# Patient Record
Sex: FEMALE | Race: WHITE
[De-identification: names, ages, dates, MRNs, and addresses within clinical notes are randomized per-mention and may not be internally consistent; named-entity substitution may affect disease eponyms.]

---

## 2018-05-16 NOTE — HISTORY AND PHYSICAL: SURG CNT
History & Physical


Date


May 16, 2018.





Chief Complaint


tonsillitis





History of Present Illness


The patient is a 17 year old female with complaints of chronic tonsillitis





Additional History


Hepatic Disease:  No


Endocrine Disorder:  No


Kidney Disease:  No


Hypertension:  No


Heart Disease:  No


Bleeding Tendencies:  No


Infectious Diseases:  No





Allergies


Coded Allergies:  


     No Known Allergies (Unverified , 5/4/18)





Home Medications


Scheduled


Clindamycin Hcl (Cleocin), 300 MG PO TID


Multiple Vitamins W/ Minerals (Centrum), 1 TAB PO DAILY





Physical Examination


Skin:  warm/dry, no rash


Eyes:  normal inspection, EOMI, sclerae normal


ENT:  normal ENT inspection, pharynx normal


Head:  normocephalic, atraumatic


Neck:  supple, no adenopathy, trachea midline


Respiratory/Chest:  lungs clear, normal breath sounds, no respiratory distress


Cardiovascular:  regular rate, rhythm, no edema, no murmur


Abdomen / GI:  normal bowel sounds, non tender


Back:  normal inspection


Extremities:  normal inspection, normal range of motion


Neurologic/Psych:  no motor/sensory deficits, alert, normal reflexes, oriented 

x 3





Diagnosis


chronic tonsillitis





Plan of Treatment


adenotonsillectomy

## 2018-05-17 ENCOUNTER — HOSPITAL ENCOUNTER (OUTPATIENT)
Dept: HOSPITAL 45 - X.SURG | Age: 18
Discharge: HOME | End: 2018-05-17
Attending: OTOLARYNGOLOGY
Payer: COMMERCIAL

## 2018-05-17 VITALS
TEMPERATURE: 99.32 F | OXYGEN SATURATION: 97 % | SYSTOLIC BLOOD PRESSURE: 107 MMHG | DIASTOLIC BLOOD PRESSURE: 64 MMHG | HEART RATE: 93 BPM

## 2018-05-17 VITALS
WEIGHT: 120.26 LBS | HEIGHT: 67.99 IN | BODY MASS INDEX: 18.23 KG/M2 | WEIGHT: 120.26 LBS | HEIGHT: 67.99 IN | BODY MASS INDEX: 18.23 KG/M2

## 2018-05-17 DIAGNOSIS — J35.3: ICD-10-CM

## 2018-05-17 DIAGNOSIS — J45.909: ICD-10-CM

## 2018-05-17 DIAGNOSIS — J35.01: Primary | ICD-10-CM

## 2018-05-17 RX ADMIN — SODIUM CHLORIDE, SODIUM LACTATE, POTASSIUM CHLORIDE, AND CALCIUM CHLORIDE SCH MLS/HR: 600; 310; 30; 20 INJECTION, SOLUTION INTRAVENOUS at 07:57

## 2018-05-17 RX ADMIN — SODIUM CHLORIDE, SODIUM LACTATE, POTASSIUM CHLORIDE, AND CALCIUM CHLORIDE SCH MLS/HR: 600; 310; 30; 20 INJECTION, SOLUTION INTRAVENOUS at 10:12

## 2018-05-17 NOTE — OPERATIVE REPORT
DATE OF OPERATION:  05/17/2018

 

PREOPERATIVE DIAGNOSES:  Chronic tonsillitis and adenoid hypertrophy.

 

POSTOPERATIVE DIAGNOSIS:  Chronic tonsillitis and adenoid hypertrophy.

 

PROCEDURE:  Adenotonsillectomy.

 

SURGEON:  Enedelia Akers MD

 

ANESTHESIA:  General endotracheal.

 

COMPLICATIONS:  None.

 

BLOOD LOSS:  5 mL

 

HISTORY:  A 17-year-old with recurrent chronic tonsillitis with 4 episodes of

peritonsillar abscess.

 

DESCRIPTION OF PROCEDURE:  The patient was brought to the operating room and

placed in supine position.  General endotracheal anesthesia was induced,

draped in the usual manner.  Mouth gag was placed.  Peritonsillar area was

injected with .5% Sensorcaine, 1:200:000 strength Epinephrine.  Soft palate

retracted using a red Vieyra catheter.  Tonsillectomy performed using the

coblation device coblating out the tonsil from anterior to the posterior

pillar and from the superior pole to the inferior pole.  Both tonsils were

removed in a similar manner.  Hemostasis was controlled with the coblation

device.  Adenoidectomy was performed using the coblation technique and

hemostasis controlled using the coblation technique.  The patient tolerated

the procedure well and was taken to the recovery room in satisfactory

condition.

 

 

I attest to the content of the Intraoperative Record and any orders documented therein. Any exception
s are noted below.

## 2018-05-17 NOTE — ANESTHESIA PROGRESS NT - [facility]
Anesthesia Post Op Note


Date & Time


May 17, 2018 at 11:08





Vital Signs


Pain Intensity:  7





Vital Signs Past 12 Hours








  Date Time  Temp Pulse Resp B/P (MAP) Pulse Ox O2 Delivery O2 Flow Rate FiO2


 


5/17/18 10:42 37.4 93 12 107/64 (78) 97 Room Air  


 


5/17/18 10:31 37.6   122/70    


 


5/17/18 10:28  91 14  96   


 


5/17/18 10:28  88 14     


 


5/17/18 10:26    123/73    


 


5/17/18 10:23  104 25  97   


 


5/17/18 10:23  105 25     


 


5/17/18 10:21    118/74    


 


5/17/18 10:18  83 16  97   


 


5/17/18 10:18  84 16     


 


5/17/18 10:16    127/70    


 


5/17/18 10:13  107 23     


 


5/17/18 10:13  103 23  100   


 


5/17/18 10:11    122/66    


 


5/17/18 10:08  80 18  100   


 


5/17/18 10:08  77 18     


 


5/17/18 10:06    109/70    


 


5/17/18 10:03  69 8  99   


 


5/17/18 10:03  69 8     


 


5/17/18 10:01    122/70    


 


5/17/18 09:58  64 8     


 


5/17/18 09:58  64 8  100   


 


5/17/18 09:56    114/66    


 


5/17/18 09:53  88 18  100   


 


5/17/18 09:53  88 18     


 


5/17/18 09:51    128/70    


 


5/17/18 09:48  66 22     


 


5/17/18 09:48  64 22  100   


 


5/17/18 09:46    126/69    


 


5/17/18 09:43  79 12     


 


5/17/18 09:43  80 12  99   


 


5/17/18 09:41    108/57    


 


5/17/18 09:39    100/61    


 


5/17/18 09:38  76      


 


5/17/18 09:38 36.4 85 12 100/61 100 Humidified Oxygen 10 





      Mask  


 


5/17/18 09:38  76   93   


 


5/17/18 07:37 36.5 61 20 125/71 (89) 99 Room Air  











Notes


Mental Status:  alert / awake / arousable, participated in evaluation


Pt Amnestic to Procedure:  Yes


Nausea / Vomiting:  adequately controlled


Pain:  adequately controlled


Airway Patency, RR, SpO2:  stable & adequate


BP & HR:  stable & adequate


Hydration State:  stable & adequate


Anesthetic Complications:  no major complications apparent

## 2018-05-17 NOTE — [facility] POST OPERATIVE BRIEF NOTE
Immediate Operative Summary


Operative Date


May 17, 2018.





Pre-Operative Diagnosis





Chronic Tonsillitis





Post-Operative Diagnosis





Same





Procedure(s) Performed





Tonsillectomy And Adenoidectomy





Surgeon


Dr. Akers





Assistant Surgeon(s)


None





Estimated Blood Loss


5 mL





Findings


Consistent with Post-Op Diagnosis





Specimens





A. Right Tonsil





B. Left Tonsil





Drains


None





Anesthesia Type


General





Complication(s)


none





Disposition


Accompanied Pt To Recover:  yes


Disposition:  Recovery Room / PACU





Overlapping Procedure


I was present for:  the critical portions of procedure.


I was immediately available:  during the entire case

## 2018-05-17 NOTE — DISCHARGE INSTRUCTIONS-[facility]
Discharge Instructions


Date of Service


May 17, 2018.





Visit


Reason for Visit:  Chronic Tonsillitis





Discharge


Discharge Diagnosis / Problem:  same





Discharge Goals


Goal(s):  Improve disease control





Activity Recommendations


Activity Limitations:  per Instructions/Follow-up section





Anesthesia


.





Post Anesthesia Instructions:





If you have had General Anesthesia or IV Sedation:





*  Do not drive today.


*  Resume driving when surgeon permits.


*  Do not make important decisions or sign legal documents today.


*  Call surgeon for:





   1.  Temperature elevations greater than 101 degrees F.


   2.  Uncontrollable pain.


   3.  Excessive bleeding.


   4.  Persistent nausea and vomiting.


   5.  Medication intolerance (nausea, vomiting or rash).





*  For nausea and vomiting use only clear liquids such as: tea, soda, bouillon 

until nausea subsides, then gradually increase diet as tolerated.





*  If you have any concerns or questions, call your surgeon's office.  If 

physician is unavailable and it is an emergency, call 911 or go to the nearest 

emergency room.





.





Instructions / Follow-Up


Instructions / Follow-Up


ACTIVITY RECOMMENDATIONS:





*   During the first few days, activities should be limited.





*   Stay indoors for several days.





*   After 48 hours, activity can gradually be increased to normal activity.








RETURN TO SCHOOL/WORK:





*  Return to school or work in one week.





*  No physical education for two weeks.








OVER THE COUNTER MEDICATIONS:





*   You may use Tylenol


*   Avoid aspirin or aspirin containing products, e.g. as they may increase 

bleeding.








SPECIAL CARE INSTRUCTIONS:





*  Avoid coughing or clearing the throat.





*  Do not use a straw.





*  A sore throat is expected frequently accompanied by pain radiating


    to the ears.  This is normal.





*  Expect bad breath until "scabs" are healed.





*  Notify the doctor if bleeding occurs, vomiting, temperature greater than


   101 degrees Fahrenheit.  Call [phone] or cell phone: [phone].





*  If bleeding occurs, it is usually in the first 24 hours or after the 5th 

day.  If 


    unable to reach the doctor, go to the nearest Emergency Department.





Special Diet:





*  Fluids are very important and should be encouraged to maintain adequate 


    hydration.





*  To maintain nutrition, eat soft foods and after 48 hours the consistency


    of foods can be increased.  Examples are jello, soup, pasta, ice cream


    and mashed foods.








FOLLOW UP VISIT:





Follow-up visit with Dr. Akers in 2 weeks.  


Please call [phone] to schedule if not already scheduled.





Diet Recommendations


Home Diet:  special diet


Diet Texture:  Mechanical Soft (ground)





Pending Studies


Studies pending at discharge:  no





Medical Emergencies








.


Who to Call and When:





Medical Emergencies:  If at any time you feel your situation is an emergency, 

please call 911 immediately.





.





Non-Emergent Contact


Non-Emergency issues call your:  Primary Care Provider





.


.








"Provider Documentation" section prepared by Enedelia Akers.








.





[place] Drug Monitoring Program


Search Results:  no issues identified